# Patient Record
Sex: FEMALE | Race: BLACK OR AFRICAN AMERICAN | NOT HISPANIC OR LATINO | Employment: OTHER | URBAN - METROPOLITAN AREA
[De-identification: names, ages, dates, MRNs, and addresses within clinical notes are randomized per-mention and may not be internally consistent; named-entity substitution may affect disease eponyms.]

---

## 2017-04-21 ENCOUNTER — HOSPITAL ENCOUNTER (EMERGENCY)
Facility: HOSPITAL | Age: 67
Discharge: HOME | End: 2017-04-21
Attending: EMERGENCY MEDICINE
Payer: COMMERCIAL

## 2017-04-21 VITALS
TEMPERATURE: 99 F | RESPIRATION RATE: 20 BRPM | HEART RATE: 80 BPM | SYSTOLIC BLOOD PRESSURE: 134 MMHG | DIASTOLIC BLOOD PRESSURE: 74 MMHG | OXYGEN SATURATION: 98 %

## 2017-04-21 DIAGNOSIS — S05.8X2A EYE ABRASION, LEFT, INITIAL ENCOUNTER: Primary | ICD-10-CM

## 2017-04-21 PROCEDURE — 99283 EMERGENCY DEPT VISIT LOW MDM: CPT

## 2017-04-21 PROCEDURE — 25000003 PHARM REV CODE 250: Performed by: EMERGENCY MEDICINE

## 2017-04-21 RX ORDER — GENTAMICIN SULFATE 0.3 %
0.5 OINTMENT (GRAM) OPHTHALMIC (EYE) 3 TIMES DAILY
Qty: 3.5 G | Refills: 0 | Status: SHIPPED | OUTPATIENT
Start: 2017-04-21 | End: 2017-04-23

## 2017-04-21 RX ORDER — TETRACAINE HYDROCHLORIDE 5 MG/ML
2 SOLUTION OPHTHALMIC
Status: COMPLETED | OUTPATIENT
Start: 2017-04-21 | End: 2017-04-21

## 2017-04-21 RX ORDER — NAPROXEN 500 MG/1
500 TABLET ORAL 2 TIMES DAILY WITH MEALS
Qty: 30 TABLET | Refills: 0 | Status: SHIPPED | OUTPATIENT
Start: 2017-04-21 | End: 2022-03-03

## 2017-04-21 RX ORDER — GENTAMICIN SULFATE 0.3 %
0.5 OINTMENT (GRAM) OPHTHALMIC (EYE)
Status: COMPLETED | OUTPATIENT
Start: 2017-04-21 | End: 2017-04-21

## 2017-04-21 RX ORDER — METFORMIN HYDROCHLORIDE 500 MG/1
500 TABLET ORAL 2 TIMES DAILY WITH MEALS
COMMUNITY
End: 2020-01-14 | Stop reason: SDUPTHER

## 2017-04-21 RX ADMIN — GENTAMICIN SULFATE 0.5 INCH: 3 OINTMENT OPHTHALMIC at 09:04

## 2017-04-21 RX ADMIN — TETRACAINE HYDROCHLORIDE 2 DROP: 5 SOLUTION OPHTHALMIC at 09:04

## 2017-04-21 RX ADMIN — FLUORESCEIN SODIUM 1 STRIP: 1 STRIP OPHTHALMIC at 09:04

## 2017-04-21 NOTE — ED AVS SNAPSHOT
[facility]  [address] [address] [address]               Key Ramirez   2017  8:43 PM   ED    Description:  Female : 1950   Department:  [facility]           Your Care was Coordinated By:     Provider Role From To    Conor Love MD Attending Provider 17 --      Reason for Visit     Foreign Body in Eye           Diagnoses this Visit        Comments    Eye abrasion, left, initial encounter    -  Primary       ED Disposition     None           To Do List           Follow-up Information     Follow up with Tomasz Epps MD In 3 days.    Specialty:  Family Medicine    Contact information:    [address] [facility] [id number] [phone]          Follow up with Hawk Desir MD In 2 days.    Specialty:  Ophthalmology    Contact information:    [address] [facility] [address]  [facility] [address]  [phone]          Follow up with [facility].    Specialty:  Emergency Medicine    Why:  If symptoms worsen, Or worsening condition or any other major concern    Contact information:    [address] [address] [address] [phone]       These Medications        Disp Refills Start End    naproxen (NAPROSYN) 500 MG tablet 30 tablet 0 2017     Take 1 tablet (500 mg total) by mouth 2 (two) times daily with meals. - Oral    gentamicin (GARAMYCIN) 0.3 % (3 mg/gram) ophthalmic ointment 3.5 g 0 2017    Place 0.5 inches into the left eye 3 (three) times daily. - Left Eye      [facility]     [facility] Care [facility] -  Assistance  Unless otherwise directed by your provider, please contact [facility], our nurse care line that is available for  assistance.     Registered nurses in the [facility] provide: appointment scheduling, clinical advisement, health education, and other advisory services.  Call: [phone]  (toll free)               Medications           Message regarding Medications     Verify the changes and/or additions to your medication regime listed below are the same as discussed with your clinician today.  If any of these changes or additions are incorrect, please notify your healthcare provider.        START taking these NEW medications        Refills    naproxen (NAPROSYN) 500 MG tablet 0    Sig: Take 1 tablet (500 mg total) by mouth 2 (two) times daily with meals.    Class: Print    Route: Oral    gentamicin (GARAMYCIN) 0.3 % (3 mg/gram) ophthalmic ointment 0    Sig: Place 0.5 inches into the left eye 3 (three) times daily.    Class: Print    Route: Left Eye      These medications were administered today        Dose Freq    tetracaine HCl (PF) 0.5 % Drop 2 drop 2 drop ED 1 Time    Sig: Place 2 drops into both eyes ED 1 Time.    Class: Normal    Route: Both Eyes    fluorescein ophthalmic strip 1 strip 1 strip ED 1 Time    Sig: Place 1 strip into the left eye ED 1 Time.    Class: Normal    Route: Left Eye    gentamicin 0.3 % (3 mg/gram) ophthalmic ointment 0.5 inch 0.5 inch ED 1 Time    Sig: Place 0.5 inches into the left eye ED 1 Time.    Class: Normal    Route: Left Eye           Verify that the below list of medications is an accurate representation of the medications you are currently taking.  If none reported, the list may be blank. If incorrect, please contact your healthcare provider. Carry this list with you in case of emergency.           Current Medications     metformin (GLUCOPHAGE) 500 MG tablet Take 500 mg by mouth 2 (two) times daily with meals.    gentamicin (GARAMYCIN) 0.3 % (3 mg/gram) ophthalmic ointment Place 0.5 inches into the left eye 3 (three) times daily.    gentamicin 0.3 % (3 mg/gram) ophthalmic ointment 0.5 inch Place 0.5 inches into the left eye ED 1 Time.    naproxen (NAPROSYN) 500 MG tablet Take 1 tablet (500 mg total) by mouth 2 (two) times daily with meals.           Clinical  Reference Information           Your Vitals Were     BP Pulse Temp Resp SpO2       134/74 80 98.5 °F (36.9 °C) (Oral) 20 98%       Allergies as of 4/21/2017        Reactions    Penicillins Hives      Immunizations Administered on Date of Encounter - 4/21/2017     None      ED Micro, Lab, POCT     None      ED Imaging Orders     None      Discharge References/Attachments     CONTUSION, EYE (ENGLISH)    EYE PROTECTION AT WORK, FIRST AID (ENGLISH)      [facility] Sign-Up     Activating your [facility] account is as easy as 1-2-3!     1) Visit [email or web address], select Sign Up Now, enter this activation code and your date of birth, then select Next.  [email or web address]  Expires: 6/5/2017  9:38 PM      2) Create a username and password to use when you visit [facility] in the future and select a security question in case you lose your password and select Next.    3) Enter your e-mail address and click Sign Up!    Additional Information  If you have questions, please e-mail [email or web address] or call [phone] to talk to our [facility] staff. Remember, [facility] is NOT to be used for urgent needs. For medical emergencies, dial 911.         Smoking Cessation     If you would like to quit smoking:   You may be eligible for free services if you are a [place] resident and started smoking cigarettes before September 1, 1988.  Call the Smoking Cessation [employer] ([facility]) toll free at [phone] or [phone].   Call 9-811-QUIT-NOW if you do not meet the above criteria.   Contact us via email: [email or web address]   View our website for more information: [email or web address]         [facility] complies with applicable Federal civil rights laws and does not discriminate on the basis of race, color, national origin, age, disability, or sex.        Language Assistance Services     ATTENTION: Language assistance services are available, free of charge. Please call [phone].      ATENCIÓN: Si  habla español, tiene a thomas disposición servicios gratuitos de asistencia lingüística. Llame al [phone].     CHÚ Ý: N?u b?n nói Ti?ng Vi?t, có các d?ch v? h? tr? ngôn ng? mi?n phí dành cho b?n. G?i s? [phone].

## 2017-04-22 NOTE — ED PROVIDER NOTES
History      Chief Complaint   Patient presents with    Foreign Body in Eye     cutting grass and believes a piece of metal may have flown into left eye; initially had blurry vision, but that has now subsided.       Review of patient's allergies indicates:   Allergen Reactions    Penicillins Hives        HPI   HPI    4/21/2017, 9:30 PM   History obtained from the patient      History of Present Illness: Key Ramirez is a 66 y.o. female patient who presents to the Emergency Department for complaints of left eye pain.  The patient notes that she was mowing the grass a short while ago with her  and noted that she was not wearing any eye protection.  The patient states that her  was weed eating and that the weedeater caught a piece of metal that flew into her left eye.  The patient noted that the metal hit the medial corner of her eye and that she was able to catch it and throat no metal to the side.  The patient noted there was some slight bleeding to the medial aspect of the eye.  The patient denied any loss of vision or blurred vision.  The patient also denies any diplopia or altered images.  The patient also denies any fever chills nausea or vomiting.  There are no other major concerns or complaints noted at this time.      Arrival mode: Personal vehicle    PCP: Tomasz Epps MD       Past Medical History:  No past medical history on file.    Past Surgical History:  No past surgical history on file.      Family History:  No family history on file.    Social History:  Social History     Social History Main Topics    Smoking status: Not on file    Smokeless tobacco: Not on file    Alcohol use Not on file    Drug use: Not on file    Sexual activity: Not on file       ROS   Review of Systems   Constitutional: Negative for fever.   HENT: Negative for sore throat.    Eyes: Positive for pain, discharge (Slight bleeding from the left medial eye ) and redness. Negative for itching.    Respiratory: Negative for shortness of breath.    Cardiovascular: Negative for chest pain.   Gastrointestinal: Negative for nausea.   Genitourinary: Negative for dysuria.   Musculoskeletal: Negative for back pain.   Skin: Negative for rash.   Neurological: Negative for weakness.   Hematological: Does not bruise/bleed easily.   All other systems reviewed and are negative.      Physical Exam    Initial Vitals   BP Pulse Resp Temp SpO2   04/21/17 1957 04/21/17 1957 04/21/17 1957 04/21/17 1957 04/21/17 1957   153/80 85 20 98.5 °F (36.9 °C) 96 %      Physical Exam   Eyes:         Nursing Notes and Vital Signs Reviewed.  Constitutional: Patient is in no acute distress. Awake and alert. Well-developed and well-nourished.  Head: Atraumatic. Normocephalic.  Eyes: PERRL. EOM intact. Conjunctivae are not pale. No scleral icterus.  ENT: Mucous membranes are moist. Oropharynx is clear and symmetric.    Neck: Supple. Full ROM. No lymphadenopathy.  Cardiovascular: Regular rate. Regular rhythm. No murmurs, rubs, or gallops. Distal pulses are 2+ and symmetric.  Pulmonary/Chest: No respiratory distress. Clear to auscultation bilaterally. No wheezing, rales, or rhonchi.  Abdominal: Soft and non-distended.  There is no tenderness.  No rebound, guarding, or rigidity. Good bowel sounds.  Genitourinary: No CVA tenderness  Musculoskeletal: Moves all extremities. No obvious deformities. No edema. No calf tenderness.  Skin: Warm and dry.  Neurological:  Alert, awake, and appropriate.  Normal speech.  No acute focal neurological deficits are appreciated.  Psychiatric: Normal affect. Good eye contact. Appropriate in content.    ED Course    Procedures  ED Vital Signs:  Vitals:    04/21/17 1957 04/21/17 2128   BP: (!) 153/80 134/74   Pulse: 85 80   Resp: 20    Temp: 98.5 °F (36.9 °C)    TempSrc: Oral    SpO2: 96% 98%       Abnormal Lab Results:  Labs Reviewed - No data to display     All Lab Results:  9:35 PM - Re-evaluation: The patient is  resting comfortably and is in no acute distress. She states that her symptoms have improved after treatment within ER. Discussed test results, shared treatment plan, specific conditions for return, and importance of follow up with patient and family.  She understands and agrees with the plan as discussed. Answered  her questions at this time. She has remained hemodynamically stable throughout the ED course and is appropriate for discharge home.  The patient be discharged home with instructions to follow-up with her primary care physician in 2-3 days and also to follow-up with her ophthalmologist in 2 days or return to the ED for any worsening symptoms or any other major concern.      Imaging Results:  Imaging Results     None                  The Emergency Provider reviewed the vital signs and test results, which are outlined above.    ED Discussion     9:30 PM - Re-evaluation: The patient is resting comfortably and is in no acute distress. She states that her symptoms have improved after treatment within ER. Discussed test results, shared treatment plan, specific conditions for return, and importance of follow up with patient and family.  She understands and agrees with the plan as discussed. Answered  her questions at this time. She has remained hemodynamically stable throughout the ED course and is appropriate for discharge home.  The patient will be discharged home with instructions to follow-up with her primary care physician and ophthalmology in 2-3 days or return to the ED for any worsening symptoms or any other major concern.    ED Medication(s):  Medications   tetracaine HCl (PF) 0.5 % Drop 2 drop (2 drops Both Eyes Given 4/21/17 2124)   fluorescein ophthalmic strip 1 strip (1 strip Left Eye Given 4/21/17 2124)   gentamicin 0.3 % (3 mg/gram) ophthalmic ointment 0.5 inch (0.5 inches Left Eye Given 4/21/17 2142)       Discharge Medication List as of 4/21/2017  9:39 PM      START taking these medications     Details   gentamicin (GARAMYCIN) 0.3 % (3 mg/gram) ophthalmic ointment Place 0.5 inches into the left eye 3 (three) times daily., Starting 4/21/2017, Until Sun 4/23/17, Print      naproxen (NAPROSYN) 500 MG tablet Take 1 tablet (500 mg total) by mouth 2 (two) times daily with meals., Starting 4/21/2017, Until Discontinued, Print             Follow-up Information     Follow up with Tomasz Epps MD In 3 days.    Specialty:  Family Medicine    Contact information:    [address] [facility] [address] [phone]          Follow up with Hawk Desir MD In 2 days.    Specialty:  Ophthalmology    Contact information:    [address] [facility]  [address]  [facility] [address] [phone]          Follow up with [facility].    Specialty:  Emergency Medicine    Why:  If symptoms worsen, Or worsening condition or any other major concern    Contact information:    [address] [address] [address] [phone]            Medical Decision Making              Scribe Attestation:   Scribe #1: I performed the above scribed service and the documentation accurately describes the services I performed. I attest to the accuracy of the note.    Attending:   Physician Attestation Statement for Scribe #1: I, No att. providers found, personally performed the services described in this documentation, as scribed by Conor Love, in my presence, and it is both accurate and complete.          Clinical Impression       ICD-10-CM ICD-9-CM   1. Eye abrasion, left, initial encounter S05.92XA 918.9       Disposition:   Disposition: Discharged  Condition: Stable         Conor Love MD  04/24/17 7232

## 2020-02-07 PROBLEM — E11.41 DIABETIC MONONEUROPATHY ASSOCIATED WITH TYPE 2 DIABETES MELLITUS: Status: ACTIVE | Noted: 2020-02-07

## 2020-02-07 PROBLEM — K21.9 GASTROESOPHAGEAL REFLUX DISEASE: Status: ACTIVE | Noted: 2020-02-07

## 2020-02-07 PROBLEM — J30.9 MILD ALLERGIC RHINITIS: Status: ACTIVE | Noted: 2020-02-07

## 2020-04-15 PROBLEM — M21.40 FLAT FOOT: Status: ACTIVE | Noted: 2020-04-15

## 2020-04-15 PROBLEM — F41.9 ANXIETY: Status: ACTIVE | Noted: 2020-04-15

## 2021-02-25 PROBLEM — E66.01 CLASS 3 SEVERE OBESITY DUE TO EXCESS CALORIES WITH SERIOUS COMORBIDITY AND BODY MASS INDEX (BMI) OF 50.0 TO 59.9 IN ADULT: Status: ACTIVE | Noted: 2021-02-25

## 2022-09-15 PROBLEM — I10 ESSENTIAL HYPERTENSION, BENIGN: Status: ACTIVE | Noted: 2022-09-15

## 2022-09-15 PROBLEM — E55.9 VITAMIN D DEFICIENCY: Status: ACTIVE | Noted: 2022-09-15

## 2023-04-20 ENCOUNTER — PATIENT MESSAGE (OUTPATIENT)
Dept: RESEARCH | Facility: HOSPITAL | Age: 73
End: 2023-04-20
Payer: COMMERCIAL